# Patient Record
Sex: FEMALE | Race: WHITE | NOT HISPANIC OR LATINO | Employment: STUDENT | URBAN - METROPOLITAN AREA
[De-identification: names, ages, dates, MRNs, and addresses within clinical notes are randomized per-mention and may not be internally consistent; named-entity substitution may affect disease eponyms.]

---
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
## 2018-05-14 ENCOUNTER — TRANSFERRED RECORDS (OUTPATIENT)
Dept: HEALTH INFORMATION MANAGEMENT | Facility: CLINIC | Age: 18
End: 2018-05-14

## 2020-03-11 ENCOUNTER — APPOINTMENT (OUTPATIENT)
Dept: GENERAL RADIOLOGY | Facility: CLINIC | Age: 20
End: 2020-03-11
Attending: EMERGENCY MEDICINE
Payer: COMMERCIAL

## 2020-03-11 ENCOUNTER — HOSPITAL ENCOUNTER (EMERGENCY)
Facility: CLINIC | Age: 20
Discharge: HOME | End: 2020-03-11
Attending: EMERGENCY MEDICINE | Admitting: EMERGENCY MEDICINE
Payer: COMMERCIAL

## 2020-03-11 VITALS
SYSTOLIC BLOOD PRESSURE: 133 MMHG | RESPIRATION RATE: 18 BRPM | DIASTOLIC BLOOD PRESSURE: 92 MMHG | HEIGHT: 64 IN | OXYGEN SATURATION: 98 % | TEMPERATURE: 98.4 F | BODY MASS INDEX: 38.96 KG/M2 | WEIGHT: 228.2 LBS | HEART RATE: 71 BPM

## 2020-03-11 DIAGNOSIS — S83.004A PATELLAR DISLOCATION, RIGHT, INITIAL ENCOUNTER: ICD-10-CM

## 2020-03-11 PROCEDURE — 99284 EMERGENCY DEPT VISIT MOD MDM: CPT | Mod: 25

## 2020-03-11 PROCEDURE — 99284 EMERGENCY DEPT VISIT MOD MDM: CPT | Mod: Z6 | Performed by: EMERGENCY MEDICINE

## 2020-03-11 PROCEDURE — 73560 X-RAY EXAM OF KNEE 1 OR 2: CPT | Mod: RT

## 2020-03-11 PROCEDURE — 29505 APPLICATION LONG LEG SPLINT: CPT | Mod: RT

## 2020-03-11 PROCEDURE — 25000132 ZZH RX MED GY IP 250 OP 250 PS 637: Performed by: EMERGENCY MEDICINE

## 2020-03-11 RX ORDER — OXYCODONE HYDROCHLORIDE 5 MG/1
5 TABLET ORAL EVERY 6 HOURS PRN
Qty: 12 TABLET | Refills: 0 | Status: SHIPPED | OUTPATIENT
Start: 2020-03-11 | End: 2020-10-09

## 2020-03-11 RX ORDER — OXYCODONE HYDROCHLORIDE 5 MG/1
10 TABLET ORAL ONCE
Status: COMPLETED | OUTPATIENT
Start: 2020-03-11 | End: 2020-03-11

## 2020-03-11 RX ADMIN — OXYCODONE HYDROCHLORIDE 10 MG: 5 TABLET ORAL at 14:15

## 2020-03-11 ASSESSMENT — MIFFLIN-ST. JEOR: SCORE: 1780.11

## 2020-03-11 ASSESSMENT — ENCOUNTER SYMPTOMS
HEADACHES: 0
VOMITING: 0
WEAKNESS: 0
BACK PAIN: 0
NECK PAIN: 0
CONFUSION: 0
WOUND: 0
NAUSEA: 0
NUMBNESS: 0

## 2020-03-11 NOTE — ED AVS SNAPSHOT
[facility], [place], Emergency Department  [address] [place] [id number]  Phone:  [phone]                                    Carmenza Blackwood   MRN: [id number]    Department:  [facility], Emergency Department   Date of Visit:  3/11/2020           After Visit Summary Signature Page    I have received my discharge instructions, and my questions have been answered. I have discussed any challenges I see with this plan with the nurse or doctor.    ..........................................................................................................................................  Patient/Patient Representative Signature      ..........................................................................................................................................  Patient Representative Print Name and Relationship to Patient    ..................................................               ................................................  Date                                   Time    ..........................................................................................................................................  Reviewed by Signature/Title    ...................................................              ..............................................  Date                                               Time          22EPIC Rev 08/18

## 2020-03-11 NOTE — DISCHARGE INSTRUCTIONS
Wear the knee immobilizer anytime you are up or not lying down.  Do not bear weight on the right leg.  Return to emergency department if you have any numbness or weakness in the right leg, feet or toes.     Please make an appointment to follow up with Orthopedics (phone: [phone]) in next week for continued care.

## 2020-03-11 NOTE — ED TRIAGE NOTES
"BIBA from [facility] w/ c/o \"knee dislocation\"  While patient was being assisted to ems sling, patient thinks her knee popped back in place.  Patient arrives a/o and usually ambulatory.  Patient slipped and fell on wet carpet. Has history of right knee dislocating.  "

## 2020-03-11 NOTE — ED PROVIDER NOTES
"ED Provider Note  [facility]      History     Chief Complaint   Patient presents with     Knee Injury     The history is provided by the patient.     Carmenza Blackwood is a 20 year old female with a medical history significant for Tiana-Danlos syndrome and anxiety who presents to the Emergency Department for evaluation of right knee pain after a mechanical slip and fall.  The patient was walking in her dorm when she slipped on wet floor and fell onto her left side.  She denies any loss of consciousness and denies striking her head during the fall.  She also denies neck or back injury from the fall.  Patient does not believe that she struck her knee against anything, but, she believes that there was a dislocation, likely of the patella.  The patient reports that she had immediate 7/10 pain in her right knee and she feels that she may have dislocated her right knee during the fall.  She reports that when EMS assessed her prior to bringing her here to the Emergency Department, she believes it \"popped back in\".  The patient reports that the knee pain has somewhat improved to 5/10 in severity, but with movement it will worsen to 7/10.  The patient reports that she has dislocated her patella in the past, last time was approximately 5 years ago. The patient reports that her pain feels similar today and states that her knee \"just does not feel right\".  The patient denies any numbness or tingling in her leg or foot.  She denies any headache, neck pain, back pain or other injuries from the fall.  The patient did not take any medications for pain management prior to arrival.  The patient denies any recent drug or alcohol use.    Past Medical History  Past Medical History:   Diagnosis Date     Tiana-Danlos syndrome      Migraines      PCOS (polycystic ovarian syndrome)      POTS (postural orthostatic tachycardia syndrome)      No past surgical history on file.  oxyCODONE (ROXICODONE) 5 MG " "tablet      Allergies no known allergies  Past medical history, past surgical history, medications, and allergies were reviewed with the patient. Additional pertinent items: Anxiety    Family History  No family history on file.  Family history was reviewed with the patient. Additional pertinent items: None    Social History  Social History     Tobacco Use     Smoking status: Never Smoker     Smokeless tobacco: Never Used   Substance Use Topics     Alcohol use: Not Currently     Drug use: Never      Social history was reviewed with the patient. Additional pertinent items: None    Review of Systems   Cardiovascular: Negative for chest pain.   Gastrointestinal: Negative for nausea and vomiting.   Musculoskeletal: Negative for back pain and neck pain.        Positive for right knee pain   Skin: Negative for wound.   Neurological: Negative for syncope, weakness, numbness and headaches.   Psychiatric/Behavioral: Negative for confusion.         Physical Exam   BP: (!) 148/84  Pulse: 81  Temp: 98.1  F (36.7  C)  Resp: 16  Height: 162.6 cm (5' 4\")  Weight: 103.5 kg (228 lb 3.2 oz)  SpO2: 95 %  Physical Exam    Gen:  Alert, no acute distress, well appearing  HEENT:  Atraumatic, no facial tenderness, ecchymosis  Neck:  No c-spine tenderness  Chest:  No chest wall tenderness, ecchymosis, lungs clear, heart sounds regular  Abdomen:  Soft, non-tender  Back: No t-spine or L-spine tenderness, no step-offs, ecchymosis  Extremities:  Moves all extremities with normal range of motion, at the time of my exam, the patient's right knee has normal appearance with no ecchymosis, no abrasion.  There is diffuse anterior knee tenderness, but no dislocation of the patella or the knee itself on exam.  Patient is reluctant to test range of motion of the knee due to pain.  She will slightly internally and externally rotate the right leg and has no pain in the hip with rotation.  Vascular: Strong and equal dorsalis pedis pulses " bilaterally.  Neuro:  Oriented X3, normal strength and sensation in the feet and toes bilaterally.  Skin:  No abrasions, lacerations      ED Course      Procedures             Patient was given oxycodone for pain and had significant improvement.  X-ray of the right knee was reviewed by me and results are shown here:  Results for orders placed or performed during the hospital encounter of 03/11/20   XR Knee Right 1/2 Views    Narrative    2 views right knee radiographs 3/11/2020 3:17 PM    History: trauma     Comparison: None    Findings:    AP and lateral views of the right knee were obtained.     Subtle cortical cortical irregularity involving the proximal patella  on the lateral view. Trace Joint effusion.    No substantial degenerative change.    Soft tissue is unremarkable.      Impression    Impression:  Subtle cortical irregularity involving the proximal pole of the  patella on the lateral view. Correlate for point tenderness in this  region.    NICOLA MEDRANO MD (Joe)     Patient was discussed with orthopedics who reviewed the x-rays.  Orthopedics was concerned there may be a slight defect on the tibia, so they recommended knee immobilizer and nonweightbearing status.  Blood pressures were taken in both calves in both arms for comparison.  Right arm blood pressure is 128/76   right leg blood pressure is 155/76  Left arm blood pressure 131/84  Left leg blood pressure 157/87.     Results for orders placed or performed during the hospital encounter of 03/11/20   XR Knee Right 1/2 Views     Status: None    Narrative    2 views right knee radiographs 3/11/2020 3:17 PM    History: trauma     Comparison: None    Findings:    AP and lateral views of the right knee were obtained.     Subtle cortical cortical irregularity involving the proximal patella  on the lateral view. Trace Joint effusion.    No substantial degenerative change.    Soft tissue is unremarkable.      Impression    Impression:  Subtle cortical  irregularity involving the proximal pole of the  patella on the lateral view. Correlate for point tenderness in this  region.    NICOLA (Barbara MEDRANO MD     Medications   oxyCODONE (ROXICODONE) tablet 10 mg (10 mg Oral Given 3/11/20 1415)        Assessments & Plan (with Medical Decision Making)   Patient presents with right knee pain after slip and fall.  Did not specifically hit the right knee, however, she noted a deformity that then spontaneously reduced when paramedics repositioned her.  Patient's knee has mild swelling but no ecchymosis.  She is able to raise the right leg and keep the lower leg elevated, but is reluctant to allow range of motion testing due to pain.  X-ray shows possible patellar injury.  Patient will wear knee immobilizer and be nonweightbearing and follow-up with orthopedics next week.  No sign of neurovascular injury at this time.  Patient was advised to return the emergency department if she has any numbness, weakness, change in color of her feet or toes or other concerns.    I have reviewed the nursing notes. I have reviewed the findings, diagnosis, plan and need for follow up with the patient.    New Prescriptions    OXYCODONE (ROXICODONE) 5 MG TABLET    Take 1 tablet (5 mg) by mouth every 6 hours as needed for pain       Final diagnoses:   Patellar dislocation, right, initial encounter       --  IToñito, am serving as a trained medical scribe to document services personally performed by Angelica Kinsey MD, based on the provider's statements to me.     Angelica GALINDO MD, was physically present and have reviewed and verified the accuracy of this note documented by Toñito Álvarez.    [facility], EMERGENCY DEPARTMENT  3/11/2020     Angelica Kinsey MD  03/11/20 [id number]

## 2020-03-13 NOTE — TELEPHONE ENCOUNTER
DIAGNOSIS: Rt Patella Dislocation Hospital F/U, Pt States ED Told Her To F/U in 1 Week Due To What Was Seen on Xray, Imaging in Epic, No Hx of Surgery, Per Pt   APPOINTMENT DATE: 3/18   NOTES STATUS DETAILS   OFFICE NOTE from referring provider N/A    OFFICE NOTE from other specialist N/A    DISCHARGE SUMMARY from hospital N/A    DISCHARGE REPORT from the ER Internal 3/11/20   OPERATIVE REPORT N/A    MEDICATION LIST Internal    MRI N/A    CT SCAN N/A    XRAYS (IMAGES & REPORTS) Internal 3/11/20

## 2020-03-16 ENCOUNTER — TELEPHONE (OUTPATIENT)
Dept: ORTHOPEDICS | Facility: CLINIC | Age: 20
End: 2020-03-16

## 2020-03-16 NOTE — TELEPHONE ENCOUNTER
Left voicemail for patient, informing her that due to the current state of COVID-19, we are trying to limit patient appointments in our clinic. Callback number was left for further discussion or reschedule.

## 2020-03-18 ENCOUNTER — PRE VISIT (OUTPATIENT)
Dept: ORTHOPEDICS | Facility: CLINIC | Age: 20
End: 2020-03-18

## 2020-10-01 ASSESSMENT — ANXIETY QUESTIONNAIRES
5. BEING SO RESTLESS THAT IT IS HARD TO SIT STILL: MORE THAN HALF THE DAYS
2. NOT BEING ABLE TO STOP OR CONTROL WORRYING: MORE THAN HALF THE DAYS
1. FEELING NERVOUS, ANXIOUS, OR ON EDGE: MORE THAN HALF THE DAYS
3. WORRYING TOO MUCH ABOUT DIFFERENT THINGS: MORE THAN HALF THE DAYS
7. FEELING AFRAID AS IF SOMETHING AWFUL MIGHT HAPPEN: MORE THAN HALF THE DAYS
4. TROUBLE RELAXING: MORE THAN HALF THE DAYS
6. BECOMING EASILY ANNOYED OR IRRITABLE: MORE THAN HALF THE DAYS
GAD7 TOTAL SCORE: 14

## 2020-10-05 ASSESSMENT — ANXIETY QUESTIONNAIRES
2. NOT BEING ABLE TO STOP OR CONTROL WORRYING: MORE THAN HALF THE DAYS
6. BECOMING EASILY ANNOYED OR IRRITABLE: MORE THAN HALF THE DAYS
GAD7 TOTAL SCORE: 14
7. FEELING AFRAID AS IF SOMETHING AWFUL MIGHT HAPPEN: MORE THAN HALF THE DAYS
5. BEING SO RESTLESS THAT IT IS HARD TO SIT STILL: MORE THAN HALF THE DAYS
3. WORRYING TOO MUCH ABOUT DIFFERENT THINGS: MORE THAN HALF THE DAYS
7. FEELING AFRAID AS IF SOMETHING AWFUL MIGHT HAPPEN: MORE THAN HALF THE DAYS
1. FEELING NERVOUS, ANXIOUS, OR ON EDGE: MORE THAN HALF THE DAYS
4. TROUBLE RELAXING: MORE THAN HALF THE DAYS
GAD7 TOTAL SCORE: 14

## 2020-10-05 ASSESSMENT — ENCOUNTER SYMPTOMS
BACK PAIN: 1
MYALGIAS: 1
ALTERED TEMPERATURE REGULATION: 1
POLYDIPSIA: 1
JOINT SWELLING: 0
CHILLS: 0
INSOMNIA: 1
DECREASED CONCENTRATION: 1
WEIGHT GAIN: 1
DECREASED APPETITE: 0
NECK PAIN: 0
HOT FLASHES: 1
INCREASED ENERGY: 1
MUSCLE CRAMPS: 0
DEPRESSION: 1
STIFFNESS: 0
NERVOUS/ANXIOUS: 1
ARTHRALGIAS: 1
FEVER: 0
WEIGHT LOSS: 0
MUSCLE WEAKNESS: 0
NIGHT SWEATS: 0
DECREASED LIBIDO: 1
PANIC: 1
HALLUCINATIONS: 0
FATIGUE: 1
POLYPHAGIA: 0

## 2020-10-09 ENCOUNTER — OFFICE VISIT (OUTPATIENT)
Dept: OBGYN | Facility: CLINIC | Age: 20
End: 2020-10-09
Attending: OBSTETRICS & GYNECOLOGY
Payer: COMMERCIAL

## 2020-10-09 VITALS
SYSTOLIC BLOOD PRESSURE: 133 MMHG | HEIGHT: 66 IN | DIASTOLIC BLOOD PRESSURE: 86 MMHG | HEART RATE: 112 BPM | BODY MASS INDEX: 36.96 KG/M2 | WEIGHT: 230 LBS

## 2020-10-09 DIAGNOSIS — Z29.9 PREVENTIVE MEASURE: Primary | ICD-10-CM

## 2020-10-09 DIAGNOSIS — E28.2 PCOS (POLYCYSTIC OVARIAN SYNDROME): ICD-10-CM

## 2020-10-09 LAB
CHOLEST SERPL-MCNC: 160 MG/DL
HBA1C MFR BLD: 5.1 % (ref 0–5.6)
HDLC SERPL-MCNC: 32 MG/DL
HGB BLD-MCNC: 14.4 G/DL (ref 11.7–15.7)
LDLC SERPL CALC-MCNC: 60 MG/DL
NONHDLC SERPL-MCNC: 128 MG/DL
TRIGL SERPL-MCNC: 339 MG/DL
TSH SERPL DL<=0.005 MIU/L-ACNC: 0.62 MU/L (ref 0.4–4)

## 2020-10-09 PROCEDURE — 80061 LIPID PANEL: CPT | Performed by: OBSTETRICS & GYNECOLOGY

## 2020-10-09 PROCEDURE — 36415 COLL VENOUS BLD VENIPUNCTURE: CPT | Performed by: OBSTETRICS & GYNECOLOGY

## 2020-10-09 PROCEDURE — 99203 OFFICE O/P NEW LOW 30 MIN: CPT | Mod: GE | Performed by: OBSTETRICS & GYNECOLOGY

## 2020-10-09 PROCEDURE — G0463 HOSPITAL OUTPT CLINIC VISIT: HCPCS

## 2020-10-09 PROCEDURE — 83036 HEMOGLOBIN GLYCOSYLATED A1C: CPT | Performed by: OBSTETRICS & GYNECOLOGY

## 2020-10-09 PROCEDURE — 85018 HEMOGLOBIN: CPT | Performed by: OBSTETRICS & GYNECOLOGY

## 2020-10-09 PROCEDURE — 84443 ASSAY THYROID STIM HORMONE: CPT | Performed by: OBSTETRICS & GYNECOLOGY

## 2020-10-09 RX ORDER — SUMATRIPTAN 100 MG/1
TABLET, FILM COATED ORAL
COMMUNITY
End: 2023-05-01

## 2020-10-09 RX ORDER — SPIRONOLACTONE 50 MG/1
50 TABLET, FILM COATED ORAL DAILY
Qty: 60 TABLET | Refills: 0 | Status: SHIPPED | OUTPATIENT
Start: 2020-10-09 | End: 2020-12-13

## 2020-10-09 ASSESSMENT — ANXIETY QUESTIONNAIRES
GAD7 TOTAL SCORE: 13
5. BEING SO RESTLESS THAT IT IS HARD TO SIT STILL: MORE THAN HALF THE DAYS
7. FEELING AFRAID AS IF SOMETHING AWFUL MIGHT HAPPEN: MORE THAN HALF THE DAYS
2. NOT BEING ABLE TO STOP OR CONTROL WORRYING: MORE THAN HALF THE DAYS
1. FEELING NERVOUS, ANXIOUS, OR ON EDGE: MORE THAN HALF THE DAYS
3. WORRYING TOO MUCH ABOUT DIFFERENT THINGS: MORE THAN HALF THE DAYS
IF YOU CHECKED OFF ANY PROBLEMS ON THIS QUESTIONNAIRE, HOW DIFFICULT HAVE THESE PROBLEMS MADE IT FOR YOU TO DO YOUR WORK, TAKE CARE OF THINGS AT HOME, OR GET ALONG WITH OTHER PEOPLE: SOMEWHAT DIFFICULT
6. BECOMING EASILY ANNOYED OR IRRITABLE: SEVERAL DAYS

## 2020-10-09 ASSESSMENT — MIFFLIN-ST. JEOR: SCORE: 1820.02

## 2020-10-09 ASSESSMENT — PATIENT HEALTH QUESTIONNAIRE - PHQ9
SUM OF ALL RESPONSES TO PHQ QUESTIONS 1-9: 14
5. POOR APPETITE OR OVEREATING: MORE THAN HALF THE DAYS

## 2020-10-09 ASSESSMENT — PAIN SCALES - GENERAL: PAINLEVEL: NO PAIN (0)

## 2020-10-09 NOTE — LETTER
Date:October 12, 2020      Provider requested that no letter be sent. Do not send.       [facility]

## 2020-10-09 NOTE — NURSING NOTE
Chief Complaint   Patient presents with     Establish Care     PCOS magement , birth control consult   Katie Parra , NUBIA

## 2020-10-09 NOTE — LETTER
10/9/2020       RE: Carmenza Blackwood  [address] [address]     Dear Colleague,    Thank you for referring your patient, Carmenza Blackwood, to the [facility] [place] at [facility]. Please see a copy of my visit note below.    [facility]  DOS: 10/9/2020  CC: weight gain and fluctuations, as well as painful periods    HPI:    Carmenza Blackwood is a 20 year old G0 here for PCOS management    Has never had a regular period, but increase in pain with them recently. Diagnosed with PCOS by irregular periods, US, and hair growth on chest/problems with acne. Has gained 15 lbs over past month.     She is currently not interested in pregnancy. Has tried OCPs and NuvaRing. Reports NuvaRing allergy, rash and swelling in pelvic area. Did not like how OCPs made her feel, caused weight gain.     Eats pretty healthy, does not usually eat breakfast. Walks ~3miles a day. Does PT several times a week.    Has migraines 2-3x per month, has spots in vision both before and during migraine.     GYN History  - LMP: Patient's last menstrual period was 09/06/2020.  - Menses: generally super heavy, can go through super tampon in 2 hours, last 7-8 days, or 1 day then stop. Regularly goes 3 months without a period  - Pap Smears: Had one at 17, thinks it was normal, has not had one since  - Contraception: no  - Sexual Activity: no  - Hx STIs: denies    OBHx  OB History   No obstetric history on file.     PMHx:   Past Medical History:   Diagnosis Date     Tiana-Danlos syndrome      Migraines      PCOS (polycystic ovarian syndrome)      POTS (postural orthostatic tachycardia syndrome)        PSHx: Appendectomy, tonsillectomy and adenoidectomy    Meds: Sertraline    Allergies:  NuvaRing    SocHx: Student at the [employer], studying special education     FamHx: Great grandmother (maternal) breast cancer  Does not know much about paternal family history    ROS: 10-Point ROS negative except  "as noted in HPI      Physical Exam  /86   Pulse 112   Ht 1.676 m (5' 6\")   Wt 104.3 kg (230 lb)   LMP 09/06/2020   Breastfeeding No   BMI 37.12 kg/m      Gen: Well-appearing, NAD  HEENT: Normocephalic, atraumatic  CV:  Regular rate  Pulm: Nonlabored on RA, no increased work of breathing  Abd: Soft, non-tender, non-distended  Ext: No LE edema, extremities warm and well perfused   Psych:   normal affect    Assessment/Plan  Carmenza Blackwood is a 20 year old G0 female here for PCOS mgmt. PMH significant for Tiana-Danlos.    PCOS:  - meets all 3 [place] criteria   - reviewed prior US records, ovaries w/ multiple follicles  - endometrial protection   - discussed OCPs vs IUD and patient would like to try Mirena IUD. Has not liked OCPs in the past  - will start 50mg daily spironolactone for anti-androgen effect  - will check lipid panel, A1C, Hgb and TSH today  - discussed increasing daily miles, continuing exercise, and trying different diet    - gave instructions on ways to try intermittent fasting if interested    Would like lab results via Docin    Follow Up: prn for Mirena IUD insertion    Patient discussed with Dr. Bermeo who is in agreement with plan.    Angy Trinidad MD  Obstetrics and Gynecology PGY-2  227PM 10/9/2020    The Patient was seen in Resident Continuity Clinic by ANGY TRINIDAD.  I reviewed the history & exam. Assessment and plan were jointly made.    April Bermeo MD              Again, thank you for allowing me to participate in the care of your patient.      Sincerely,    Angy Trinidad MD      "

## 2020-10-09 NOTE — PATIENT INSTRUCTIONS
Take 800mg ibuprofen, and 1000mg tylenol 1 hour prior to IUD insertion.     You can try increasing daily walking length, and intermittent fasting.     Spironolactone daily for anti-androgen effects.

## 2020-10-09 NOTE — PROGRESS NOTES
"[facility]  DOS: 10/9/2020  CC: weight gain and fluctuations, as well as painful periods    HPI:    Carmenza Blackwood is a 20 year old G0 here for PCOS management    Has never had a regular period, but increase in pain with them recently. Diagnosed with PCOS by irregular periods, US, and hair growth on chest/problems with acne. Has gained 15 lbs over past month.     She is currently not interested in pregnancy. Has tried OCPs and NuvaRing. Reports NuvaRing allergy, rash and swelling in pelvic area. Did not like how OCPs made her feel, caused weight gain.     Eats pretty healthy, does not usually eat breakfast. Walks ~3miles a day. Does PT several times a week.    Has migraines 2-3x per month, has spots in vision both before and during migraine.     GYN History  - LMP: Patient's last menstrual period was 09/06/2020.  - Menses: generally super heavy, can go through super tampon in 2 hours, last 7-8 days, or 1 day then stop. Regularly goes 3 months without a period  - Pap Smears: Had one at 17, thinks it was normal, has not had one since  - Contraception: no  - Sexual Activity: no  - Hx STIs: denies    OBHx  OB History   No obstetric history on file.     PMHx:   Past Medical History:   Diagnosis Date     Tiana-Danlos syndrome      Migraines      PCOS (polycystic ovarian syndrome)      POTS (postural orthostatic tachycardia syndrome)        PSHx: Appendectomy, tonsillectomy and adenoidectomy    Meds: Sertraline    Allergies:  NuvaRing    SocHx: Student at the [employer], studying special education     FamHx: Great grandmother (maternal) breast cancer  Does not know much about paternal family history    ROS: 10-Point ROS negative except as noted in HPI      Physical Exam  /86   Pulse 112   Ht 1.676 m (5' 6\")   Wt 104.3 kg (230 lb)   LMP 09/06/2020   Breastfeeding No   BMI 37.12 kg/m      Gen: Well-appearing, NAD  HEENT: Normocephalic, atraumatic  CV:  Regular rate  Pulm: Nonlabored on RA, no increased work of " breathing  Abd: Soft, non-tender, non-distended  Ext: No LE edema, extremities warm and well perfused   Psych:   normal affect    Assessment/Plan  Carmenza Blackwood is a 20 year old G0 female here for PCOS mgmt. PMH significant for Tiana-Danlos.    PCOS:  - meets all 3 [place] criteria   - reviewed prior US records, ovaries w/ multiple follicles  - endometrial protection   - discussed OCPs vs IUD and patient would like to try Mirena IUD. Has not liked OCPs in the past  - will start 50mg daily spironolactone for anti-androgen effect  - will check lipid panel, A1C, Hgb and TSH today  - discussed increasing daily miles, continuing exercise, and trying different diet    - gave instructions on ways to try intermittent fasting if interested    Would like lab results via Referral[facility]IM    Follow Up: prn for Mirena IUD insertion    Patient discussed with Dr. Bermeo who is in agreement with plan.    Angy Trinidad MD  Obstetrics and Gynecology PGY-2  227PM 10/9/2020    The Patient was seen in Resident Continuity Clinic by ANGY TRINIDAD.  I reviewed the history & exam. Assessment and plan were jointly made.    April Bermeo MD

## 2020-10-27 ENCOUNTER — OFFICE VISIT (OUTPATIENT)
Dept: OBGYN | Facility: CLINIC | Age: 20
End: 2020-10-27
Payer: COMMERCIAL

## 2020-10-27 VITALS
BODY MASS INDEX: 36.64 KG/M2 | WEIGHT: 228 LBS | HEIGHT: 66 IN | SYSTOLIC BLOOD PRESSURE: 137 MMHG | DIASTOLIC BLOOD PRESSURE: 85 MMHG | HEART RATE: 108 BPM

## 2020-10-27 DIAGNOSIS — Z30.430 ENCOUNTER FOR IUD INSERTION: Primary | ICD-10-CM

## 2020-10-27 DIAGNOSIS — E28.2 PCOS (POLYCYSTIC OVARIAN SYNDROME): Chronic | ICD-10-CM

## 2020-10-27 DIAGNOSIS — Z30.430 ENCOUNTER FOR INSERTION OF INTRAUTERINE CONTRACEPTIVE DEVICE: ICD-10-CM

## 2020-10-27 PROCEDURE — G0463 HOSPITAL OUTPT CLINIC VISIT: HCPCS | Mod: 25 | Performed by: STUDENT IN AN ORGANIZED HEALTH CARE EDUCATION/TRAINING PROGRAM

## 2020-10-27 PROCEDURE — 58300 INSERT INTRAUTERINE DEVICE: CPT | Performed by: STUDENT IN AN ORGANIZED HEALTH CARE EDUCATION/TRAINING PROGRAM

## 2020-10-27 PROCEDURE — 250N000011 HC RX IP 250 OP 636: Performed by: STUDENT IN AN ORGANIZED HEALTH CARE EDUCATION/TRAINING PROGRAM

## 2020-10-27 PROCEDURE — 58300 INSERT INTRAUTERINE DEVICE: CPT | Mod: GC | Performed by: OBSTETRICS & GYNECOLOGY

## 2020-10-27 RX ORDER — PROPRANOLOL HYDROCHLORIDE 20 MG/1
20 TABLET ORAL 2 TIMES DAILY
COMMUNITY
Start: 2020-10-14 | End: 2021-12-13

## 2020-10-27 RX ADMIN — LEVONORGESTREL 20 MCG: 52 INTRAUTERINE DEVICE INTRAUTERINE at 15:24

## 2020-10-27 ASSESSMENT — PAIN SCALES - GENERAL: PAINLEVEL: NO PAIN (0)

## 2020-10-27 ASSESSMENT — MIFFLIN-ST. JEOR: SCORE: 1810.95

## 2020-10-27 NOTE — PROCEDURES
[facility]  IUD Insertion Procedure Note   Date of Service: 10/27/2020     Patient: Carmenza Blackwood   MRN: [id number]  : 2000      Consent: Prior to the procedure the expected benefits, risks, and alteratives to Mirena IUD placement were discussed with the patient. All questions were answered. Patient expressed desire to proceed with Mirena IUD placement. Written informed consent was signed and scanned into medical record.      Indication: Carmenza Blackwood is a 20 year old G0 with PCOS, oligomenorrhea, and need for endometrial protection.      Findings: Normal appearing external genitalia without lesions. Anteverted, mobile uterus. Pink, well rugated vaginal mucosa. Smooth, mobile cervix without masses or lesions. Hemostasis noted at end of procedure.     EBL: 2 mL     Implant: Mirena IUD, LOT: [id number] EXP: DEC 2022   Complications: None     Procedure Details:   The patient was positioned in dorsal lithotomy with feet in stirrups. A Medium graves speculum was placed in the vagina to visualize the cervix. The cervix was prepped with betadine x3. The anterior lip of the cervix was grasped with a single tooth tenaculum. A uterine sound was unable to be passed into the uterine cavity. The cervix was dilated and the sound was passed to measure the uterine cavity. The Mirena IUD insertion apparatus was prepared, inserted through the cervix without difficulty, and deployed at the fundus in the usual fashion per the 's guidelines. The IUD strings were trimmed. The tenaculum was removed from the cervix and hemostasis was noted. The speculum was removed from the vagina.      The patient tolerated the procedure with significant discomfort.      Dr. Marshall was present for the entire procedure.      Last Crain MD  Ob/Gyn Resident, PGY-2  10/27/20 2:37 PM     The Patient was seen in Resident Continuity Clinic by LAST CRAIN  I reviewed the history & exam. Assessment and plan were jointly made. I was  present for IUD insertion.     Kamilla Marshall MD

## 2020-10-27 NOTE — Clinical Note
Continuity clinic procedure note from 10/27/2020 requires cosign. Thank you for helping with this procedure and ensuring patient was able to have her IUD placed.

## 2020-10-27 NOTE — PROGRESS NOTES
[facility]  IUD Insertion Procedure Note   Date of Service: 10/27/2020    Patient: Carmenza Blackwood   MRN: [id number]  : 2000     Consent: Prior to the procedure the expected benefits, risks, and alteratives to *** IUD placement were discussed with the patient. All questions were answered. Patient expressed desire to proceed with *** IUD placement. Written informed consent was signed and scanned into medical record.     Indication: Carmenza Blackwood is a 20 year old G***P*** with PCOS *** desire for long acting reversible contraception***abnormal uterine bleeding***need emergency contraception***endometrial hyperplasia***.     Findings: *** Normal appearing external genitalia without lesions. Pink, well rugated vaginal mucosa. Smooth, mobile cervix without masses or lesions. Hemostasis noted at end of procedure. ***    EBL: 2 mL    Implant: Mirena IUD, LOT: [id number] EXP: DEC 2022   Complications: None    Procedure Details:   The patient was positioned in dorsal lithotomy with feet in stirrups. A *** speculum was placed in the vagina to visualize the cervix. The cervix was prepped with ***betadine x3. *** The anterior lip of the cervix was *** injected with 1 ml of  ***1% lidocaine *** and grasped with a single tooth tenaculum. ***A paracervical block was preformed at 4 o'clock and 8 o'clock for a total of *** ml of ***1% lidocaine *** used. The uterus was then sounded to *** cm. The *** IUD insertion apparatus was prepared, inserted through the cervix without difficulty, and deployed at the fundus in the usual fashion per the 's guidelines. The IUD strings were trimmed to approximately 3 cm. The tenaculum was removed from the cervix and hemostasis was noted after ***. The speculum was removed from the vagina.     The patient tolerated the procedure well.     *** was present for the entire procedure     Last Ocampo MD  Ob/Gyn Resident, PGY-2  10/27/20 2:37 PM

## 2020-10-28 PROBLEM — E28.2 PCOS (POLYCYSTIC OVARIAN SYNDROME): Chronic | Status: ACTIVE | Noted: 2020-10-28

## 2020-10-30 ENCOUNTER — HOSPITAL ENCOUNTER (EMERGENCY)
Facility: CLINIC | Age: 20
Discharge: HOME | End: 2020-10-31
Attending: EMERGENCY MEDICINE | Admitting: EMERGENCY MEDICINE
Payer: COMMERCIAL

## 2020-10-30 DIAGNOSIS — R10.2 PELVIC PAIN IN FEMALE: ICD-10-CM

## 2020-10-30 DIAGNOSIS — Z97.5 IUD (INTRAUTERINE DEVICE) IN PLACE: ICD-10-CM

## 2020-10-30 PROCEDURE — 99284 EMERGENCY DEPT VISIT MOD MDM: CPT | Mod: 25 | Performed by: EMERGENCY MEDICINE

## 2020-10-30 PROCEDURE — 99283 EMERGENCY DEPT VISIT LOW MDM: CPT | Performed by: EMERGENCY MEDICINE

## 2020-10-30 ASSESSMENT — MIFFLIN-ST. JEOR: SCORE: 1774.66

## 2020-10-30 NOTE — ED AVS SNAPSHOT
[facility] Emergency Department  [address] [address]  Phone: [phone]                                    Carmenza Blackwood   MRN: [id number]    Department: [facility] Emergency Department   Date of Visit: 10/30/2020           After Visit Summary Signature Page    I have received my discharge instructions, and my questions have been answered. I have discussed any challenges I see with this plan with the nurse or doctor.    ..........................................................................................................................................  Patient/Patient Representative Signature      ..........................................................................................................................................  Patient Representative Print Name and Relationship to Patient    ..................................................               ................................................  Date                                   Time    ..........................................................................................................................................  Reviewed by Signature/Title    ...................................................              ..............................................  Date                                               Time          22EPIC Rev 08/18

## 2020-10-31 ENCOUNTER — APPOINTMENT (OUTPATIENT)
Dept: ULTRASOUND IMAGING | Facility: CLINIC | Age: 20
End: 2020-10-31
Attending: EMERGENCY MEDICINE
Payer: COMMERCIAL

## 2020-10-31 VITALS
WEIGHT: 220 LBS | SYSTOLIC BLOOD PRESSURE: 141 MMHG | OXYGEN SATURATION: 100 % | HEART RATE: 78 BPM | BODY MASS INDEX: 35.36 KG/M2 | RESPIRATION RATE: 16 BRPM | DIASTOLIC BLOOD PRESSURE: 97 MMHG | HEIGHT: 66 IN | TEMPERATURE: 98.1 F

## 2020-10-31 LAB
SPECIMEN SOURCE: NORMAL
WET PREP SPEC: NORMAL

## 2020-10-31 PROCEDURE — 87210 SMEAR WET MOUNT SALINE/INK: CPT | Performed by: EMERGENCY MEDICINE

## 2020-10-31 PROCEDURE — 76856 US EXAM PELVIC COMPLETE: CPT

## 2020-10-31 PROCEDURE — 76856 US EXAM PELVIC COMPLETE: CPT | Mod: 26 | Performed by: RADIOLOGY

## 2020-10-31 PROCEDURE — 76830 TRANSVAGINAL US NON-OB: CPT | Mod: 26 | Performed by: RADIOLOGY

## 2020-10-31 NOTE — DISCHARGE INSTRUCTIONS
Please make an appointment to follow up with OB/Gyn - [facility] (phone: [phone]) in 3 days if not improving.

## 2020-10-31 NOTE — ED TRIAGE NOTES
"Pt arrives to triage with concerns about her IUD. Pt had IUD placed on Tuesday. Since Tuesday, pt reports increased abdominal and vaginal pain. Pt reports vaginal bleeding, \"more than a period\". Pt currently is not wearing a pad. A&O, VSS  "

## 2020-12-03 ENCOUNTER — MYC MEDICAL ADVICE (OUTPATIENT)
Dept: OBGYN | Facility: CLINIC | Age: 20
End: 2020-12-03

## 2020-12-03 DIAGNOSIS — R10.2 PELVIC PAIN IN FEMALE: Primary | ICD-10-CM

## 2020-12-03 NOTE — TELEPHONE ENCOUNTER
"Called Carmenza and advised her that ultrasound order is placed, and transferred to     Carmenza Blackwood Nickey, MD 1 hour ago (2:33 PM)        I think I feel the actual IUD in my vagina, so I am going to schedule an ultrasound. As I ended up in the ER the same week as placement for dislodgment, I think this is the best option.            April Bermeo MD Dwyer, Kaitlyn 2 hours ago (1:29 PM)        Jose Herr,      It's not \"normal\" but also it does happen sometimes.      Any chance the IUD is not there? Can you feel the strings?  Next steps:  1. Wait to see if it persists or is a one time thing.   2. If you cannot feel the strings or don't want to wait, please schedule an ultrasound  3. If the ultrasound shows the IUD is in the correct spot then we can talk about other options.     Dr. Bermeo for Dr. Trinidad (who will be in clinic 12/4)               Elo Garza LPN routed conversation to April Bermeo MD 6 hours ago (9:40 AM)      Carmenza Blackwood Nickey, MD 13 hours ago (2:01 AM)        Hi Doc,  I got my IUD put in awhile ago and I'm on my first period since the IUD. The problem is I'm currently on day 18 of my period and its all fresh blood, it had started off as brown dark blood after the first day or two, but changed back a day or two ago. Is it normal? Cause it doesn't seem normal.       "

## 2020-12-04 ENCOUNTER — ANCILLARY PROCEDURE (OUTPATIENT)
Dept: ULTRASOUND IMAGING | Facility: CLINIC | Age: 20
End: 2020-12-04
Attending: STUDENT IN AN ORGANIZED HEALTH CARE EDUCATION/TRAINING PROGRAM
Payer: COMMERCIAL

## 2020-12-04 DIAGNOSIS — R10.2 PELVIC PAIN IN FEMALE: ICD-10-CM

## 2020-12-04 PROCEDURE — 76830 TRANSVAGINAL US NON-OB: CPT | Mod: 26 | Performed by: OBSTETRICS & GYNECOLOGY

## 2020-12-04 PROCEDURE — 76830 TRANSVAGINAL US NON-OB: CPT

## 2020-12-13 ENCOUNTER — MYC REFILL (OUTPATIENT)
Dept: OBGYN | Facility: CLINIC | Age: 20
End: 2020-12-13

## 2020-12-13 DIAGNOSIS — E28.2 PCOS (POLYCYSTIC OVARIAN SYNDROME): ICD-10-CM

## 2020-12-15 RX ORDER — SPIRONOLACTONE 50 MG/1
50 TABLET, FILM COATED ORAL DAILY
Qty: 60 TABLET | Refills: 0 | Status: SHIPPED | OUTPATIENT
Start: 2020-12-15 | End: 2021-02-10

## 2021-01-04 ENCOUNTER — HEALTH MAINTENANCE LETTER (OUTPATIENT)
Age: 21
End: 2021-01-04

## 2021-02-10 ENCOUNTER — MYC REFILL (OUTPATIENT)
Dept: OBGYN | Facility: CLINIC | Age: 21
End: 2021-02-10

## 2021-02-10 DIAGNOSIS — E28.2 PCOS (POLYCYSTIC OVARIAN SYNDROME): ICD-10-CM

## 2021-02-11 RX ORDER — SPIRONOLACTONE 50 MG/1
50 TABLET, FILM COATED ORAL DAILY
Qty: 60 TABLET | Refills: 0 | Status: SHIPPED | OUTPATIENT
Start: 2021-02-11 | End: 2021-03-29

## 2021-03-29 ENCOUNTER — MYC REFILL (OUTPATIENT)
Dept: OBGYN | Facility: CLINIC | Age: 21
End: 2021-03-29

## 2021-03-29 DIAGNOSIS — E28.2 PCOS (POLYCYSTIC OVARIAN SYNDROME): ICD-10-CM

## 2021-04-01 RX ORDER — SPIRONOLACTONE 50 MG/1
50 TABLET, FILM COATED ORAL DAILY
Qty: 90 TABLET | Refills: 1 | Status: SHIPPED | OUTPATIENT
Start: 2021-04-01 | End: 2021-12-13

## 2021-04-01 NOTE — TELEPHONE ENCOUNTER
Received refill request for spironolactone for PCOS. Patient was seen in continuity clinic 10/2020 where this was discussed and IUD was placed. Rx sent per protocol.

## 2021-10-10 ENCOUNTER — HEALTH MAINTENANCE LETTER (OUTPATIENT)
Age: 21
End: 2021-10-10

## 2021-11-30 ENCOUNTER — LAB REQUISITION (OUTPATIENT)
Dept: LAB | Facility: CLINIC | Age: 21
End: 2021-11-30

## 2021-11-30 LAB — HBV SURFACE AB SERPL IA-ACNC: 1.68 M[IU]/ML

## 2021-11-30 PROCEDURE — 86481 TB AG RESPONSE T-CELL SUSP: CPT | Performed by: INTERNAL MEDICINE

## 2021-11-30 PROCEDURE — 86706 HEP B SURFACE ANTIBODY: CPT | Performed by: INTERNAL MEDICINE

## 2021-12-02 LAB
GAMMA INTERFERON BACKGROUND BLD IA-ACNC: 0 IU/ML
M TB IFN-G BLD-IMP: NEGATIVE
M TB IFN-G CD4+ BCKGRND COR BLD-ACNC: 9.57 IU/ML
MITOGEN IGNF BCKGRD COR BLD-ACNC: 0 IU/ML
MITOGEN IGNF BCKGRD COR BLD-ACNC: 0 IU/ML
QUANTIFERON MITOGEN: 9.57 IU/ML
QUANTIFERON NIL TUBE: 0 IU/ML
QUANTIFERON TB1 TUBE: 0 IU/ML
QUANTIFERON TB2 TUBE: 0

## 2021-12-12 NOTE — PROGRESS NOTES
"  Assessment & Plan     Dislocation of right patella, subsequent encounter  Workability form was completed to reflect that she is able to work without restrictions.  Follow-up as needed for this concern.    High priority for 2019-nCoV vaccine  Moderna booster was provided today.  - COVID-19,PF,MODERNA (18+ Yrs BOOSTER .25mL)             BMI:   Estimated body mass index is 39.48 kg/m  as calculated from the following:    Height as of this encounter: 1.626 m (5' 4\").    Weight as of this encounter: 104.3 kg (230 lb).           No follow-ups on file.    Augusto Christie MD  [facility]    Veena Burgos is a 21 year old who presents for the following health issues     History of Present Illness       She eats 0-1 servings of fruits and vegetables daily.She consumes 1 sweetened beverage(s) daily.She exercises with enough effort to increase her heart rate 10 to 19 minutes per day.  She exercises with enough effort to increase her heart rate 3 or less days per week.   She is taking medications regularly.     Form to fill out.       Patient needs a workability form completed for her employer, [employer].  She had a right patellar dislocation that was nonwork-related about 1-1/2 years ago in March 2020.  She was in a knee immobilizer for about a month and then physical therapy but reports that she has made a full recovery since that time and did not have any significant temporary or permanent restrictions placed upon her.  She has been working in previous position without restriction.    She is also interested in receiving a booster shot for the majority vaccine today.    Review of Systems   Constitutional, HEENT, cardiovascular, pulmonary, gi and gu systems are negative, except as otherwise noted.      Objective    BP (!) 137/91 (BP Location: Left arm, Patient Position: Sitting, Cuff Size: Adult Regular)   Pulse 85   Temp (!) 96.6  F (35.9  C) (Temporal)   Ht 1.626 m (5' 4\")   Wt 104.3 kg " (230 lb)   SpO2 96%   BMI 39.48 kg/m    Body mass index is 39.48 kg/m .  Physical Exam   GENERAL: healthy, alert and no distress  EYES: Eyes grossly normal to inspection, PERRL and conjunctivae and sclerae normal  MS: no gross musculoskeletal defects noted, no edema  MS: Both knees are normal in appearance except for a superficial abrasion sustained on the right knee last week.  This appears to be healing without infection.  Range of motion is normal with both knees.  No significant crepitus.  No effusion.  No joint line tenderness.  No patellar tenderness.  Both knees are stable to varus and valgus stress.  Anterior and posterior drawer is normal.  SKIN: no suspicious lesions or rashes  NEURO: Normal strength and tone, mentation intact and speech normal  PSYCH: mentation appears normal, affect normal/bright

## 2021-12-13 ENCOUNTER — TELEPHONE (OUTPATIENT)
Dept: FAMILY MEDICINE | Facility: CLINIC | Age: 21
End: 2021-12-13

## 2021-12-13 ENCOUNTER — OFFICE VISIT (OUTPATIENT)
Dept: FAMILY MEDICINE | Facility: CLINIC | Age: 21
End: 2021-12-13
Payer: COMMERCIAL

## 2021-12-13 VITALS
OXYGEN SATURATION: 96 % | BODY MASS INDEX: 39.27 KG/M2 | HEIGHT: 64 IN | SYSTOLIC BLOOD PRESSURE: 137 MMHG | DIASTOLIC BLOOD PRESSURE: 91 MMHG | HEART RATE: 85 BPM | TEMPERATURE: 96.6 F | WEIGHT: 230 LBS

## 2021-12-13 DIAGNOSIS — S83.004D DISLOCATION OF RIGHT PATELLA, SUBSEQUENT ENCOUNTER: Primary | ICD-10-CM

## 2021-12-13 DIAGNOSIS — Z23 HIGH PRIORITY FOR 2019-NCOV VACCINE: ICD-10-CM

## 2021-12-13 PROBLEM — Q79.62 EHLERS-DANLOS SYNDROME, TYPE 3: Status: ACTIVE | Noted: 2021-12-13

## 2021-12-13 PROBLEM — G43.909 MIGRAINE HEADACHE: Status: ACTIVE | Noted: 2021-12-13

## 2021-12-13 PROBLEM — R53.82 CHRONIC FATIGUE: Status: ACTIVE | Noted: 2021-12-13

## 2021-12-13 PROBLEM — F41.9 ANXIETY: Status: ACTIVE | Noted: 2021-12-13

## 2021-12-13 PROBLEM — F33.0 MILD RECURRENT MAJOR DEPRESSION (H): Status: ACTIVE | Noted: 2021-12-13

## 2021-12-13 PROCEDURE — 99203 OFFICE O/P NEW LOW 30 MIN: CPT | Performed by: FAMILY MEDICINE

## 2021-12-13 PROCEDURE — 0064A COVID-19,PF,MODERNA (18+ YRS BOOSTER .25ML): CPT | Performed by: FAMILY MEDICINE

## 2021-12-13 PROCEDURE — 91306 COVID-19,PF,MODERNA (18+ YRS BOOSTER .25ML): CPT | Performed by: FAMILY MEDICINE

## 2021-12-13 ASSESSMENT — MIFFLIN-ST. JEOR: SCORE: 1783.27

## 2021-12-13 NOTE — TELEPHONE ENCOUNTER
Reason for Call:  Form, our goal is to have forms completed with 72 hours, however, some forms may require a visit or additional information.    Type of letter, form or note:  Work Ability Form    Who is the form from?: Patient    Where did the form come from: Patient or family brought in       What clinic location was the form placed at?: [facility]    Where the form was placed: Given to physician    What number is listed as a contact on the form?:        Additional comments:     Call taken on 12/13/2021 at 9:29 AM by Ruslan Lennon MA

## 2021-12-13 NOTE — TELEPHONE ENCOUNTER
Form was completed with physiciian during visit.   Fax to [facility] at [phone] and [employer]

## 2022-01-30 ENCOUNTER — HEALTH MAINTENANCE LETTER (OUTPATIENT)
Age: 22
End: 2022-01-30

## 2022-05-01 ENCOUNTER — TRANSFERRED RECORDS (OUTPATIENT)
Dept: MULTI SPECIALTY CLINIC | Facility: CLINIC | Age: 22
End: 2022-05-01

## 2022-05-01 LAB — PAP SMEAR - HIM PATIENT REPORTED: NEGATIVE

## 2022-09-24 ENCOUNTER — HEALTH MAINTENANCE LETTER (OUTPATIENT)
Age: 22
End: 2022-09-24

## 2023-04-24 ASSESSMENT — ENCOUNTER SYMPTOMS
BREAST MASS: 0
JOINT SWELLING: 0
CHILLS: 1
WEAKNESS: 1
SORE THROAT: 0
MYALGIAS: 1
ABDOMINAL PAIN: 1
DIARRHEA: 1
PALPITATIONS: 0
ARTHRALGIAS: 1
FREQUENCY: 1
NAUSEA: 1
DIZZINESS: 1
EYE PAIN: 0
NERVOUS/ANXIOUS: 1
FEVER: 0
SHORTNESS OF BREATH: 0
HEADACHES: 1
DYSURIA: 0
PARESTHESIAS: 0
HEMATOCHEZIA: 1
HEARTBURN: 0

## 2023-05-01 ENCOUNTER — OFFICE VISIT (OUTPATIENT)
Dept: FAMILY MEDICINE | Facility: CLINIC | Age: 23
End: 2023-05-01
Payer: COMMERCIAL

## 2023-05-01 VITALS
OXYGEN SATURATION: 97 % | RESPIRATION RATE: 16 BRPM | TEMPERATURE: 97.8 F | BODY MASS INDEX: 40.02 KG/M2 | HEART RATE: 99 BPM | HEIGHT: 66 IN | WEIGHT: 249 LBS | DIASTOLIC BLOOD PRESSURE: 89 MMHG | SYSTOLIC BLOOD PRESSURE: 128 MMHG

## 2023-05-01 DIAGNOSIS — Z11.59 NEED FOR HEPATITIS C SCREENING TEST: ICD-10-CM

## 2023-05-01 DIAGNOSIS — N93.9 ABNORMAL UTERINE BLEEDING (AUB): ICD-10-CM

## 2023-05-01 DIAGNOSIS — R19.7 DIARRHEA, UNSPECIFIED TYPE: Primary | ICD-10-CM

## 2023-05-01 DIAGNOSIS — Z11.4 SCREENING FOR HIV (HUMAN IMMUNODEFICIENCY VIRUS): ICD-10-CM

## 2023-05-01 DIAGNOSIS — G43.009 MIGRAINE WITHOUT AURA AND WITHOUT STATUS MIGRAINOSUS, NOT INTRACTABLE: ICD-10-CM

## 2023-05-01 DIAGNOSIS — Z12.4 CERVICAL CANCER SCREENING: ICD-10-CM

## 2023-05-01 DIAGNOSIS — Z23 NEED FOR DIPHTHERIA-TETANUS-PERTUSSIS (TDAP) VACCINE: ICD-10-CM

## 2023-05-01 DIAGNOSIS — E66.01 MORBID OBESITY (H): ICD-10-CM

## 2023-05-01 LAB
ALBUMIN SERPL BCG-MCNC: 4.5 G/DL (ref 3.5–5.2)
ALP SERPL-CCNC: 74 U/L (ref 35–104)
ALT SERPL W P-5'-P-CCNC: 25 U/L (ref 10–35)
ANION GAP SERPL CALCULATED.3IONS-SCNC: 13 MMOL/L (ref 7–15)
AST SERPL W P-5'-P-CCNC: 24 U/L (ref 10–35)
BILIRUB SERPL-MCNC: 0.5 MG/DL
BUN SERPL-MCNC: 9.2 MG/DL (ref 6–20)
CALCIUM SERPL-MCNC: 10.1 MG/DL (ref 8.6–10)
CHLORIDE SERPL-SCNC: 102 MMOL/L (ref 98–107)
CHOLEST SERPL-MCNC: 175 MG/DL
CREAT SERPL-MCNC: 0.8 MG/DL (ref 0.51–0.95)
DEPRECATED HCO3 PLAS-SCNC: 23 MMOL/L (ref 22–29)
ERYTHROCYTE [DISTWIDTH] IN BLOOD BY AUTOMATED COUNT: 12.9 % (ref 10–15)
ERYTHROCYTE [SEDIMENTATION RATE] IN BLOOD BY WESTERGREN METHOD: 17 MM/HR (ref 0–20)
GFR SERPL CREATININE-BSD FRML MDRD: >90 ML/MIN/1.73M2
GLUCOSE SERPL-MCNC: 94 MG/DL (ref 70–99)
HBA1C MFR BLD: 5.2 % (ref 0–5.6)
HCT VFR BLD AUTO: 44.6 % (ref 35–47)
HDLC SERPL-MCNC: 33 MG/DL
HGB BLD-MCNC: 15.7 G/DL (ref 11.7–15.7)
LDLC SERPL CALC-MCNC: 117 MG/DL
MCH RBC QN AUTO: 29.4 PG (ref 26.5–33)
MCHC RBC AUTO-ENTMCNC: 35.2 G/DL (ref 31.5–36.5)
MCV RBC AUTO: 84 FL (ref 78–100)
NONHDLC SERPL-MCNC: 142 MG/DL
PLATELET # BLD AUTO: 379 10E3/UL (ref 150–450)
POTASSIUM SERPL-SCNC: 4.2 MMOL/L (ref 3.4–5.3)
PROLACTIN SERPL 3RD IS-MCNC: 12 NG/ML (ref 5–23)
PROT SERPL-MCNC: 7.8 G/DL (ref 6.4–8.3)
RBC # BLD AUTO: 5.34 10E6/UL (ref 3.8–5.2)
SODIUM SERPL-SCNC: 138 MMOL/L (ref 136–145)
TRIGL SERPL-MCNC: 124 MG/DL
TSH SERPL DL<=0.005 MIU/L-ACNC: 0.83 UIU/ML (ref 0.3–4.2)
WBC # BLD AUTO: 8.3 10E3/UL (ref 4–11)

## 2023-05-01 PROCEDURE — 86803 HEPATITIS C AB TEST: CPT | Performed by: FAMILY MEDICINE

## 2023-05-01 PROCEDURE — 99395 PREV VISIT EST AGE 18-39: CPT | Mod: 25 | Performed by: FAMILY MEDICINE

## 2023-05-01 PROCEDURE — 87209 SMEAR COMPLEX STAIN: CPT | Performed by: FAMILY MEDICINE

## 2023-05-01 PROCEDURE — 87506 IADNA-DNA/RNA PROBE TQ 6-11: CPT | Performed by: FAMILY MEDICINE

## 2023-05-01 PROCEDURE — 84443 ASSAY THYROID STIM HORMONE: CPT | Performed by: FAMILY MEDICINE

## 2023-05-01 PROCEDURE — 99214 OFFICE O/P EST MOD 30 MIN: CPT | Mod: 25 | Performed by: FAMILY MEDICINE

## 2023-05-01 PROCEDURE — 36415 COLL VENOUS BLD VENIPUNCTURE: CPT | Performed by: FAMILY MEDICINE

## 2023-05-01 PROCEDURE — 87177 OVA AND PARASITES SMEARS: CPT | Performed by: FAMILY MEDICINE

## 2023-05-01 PROCEDURE — 80053 COMPREHEN METABOLIC PANEL: CPT | Performed by: FAMILY MEDICINE

## 2023-05-01 PROCEDURE — 83036 HEMOGLOBIN GLYCOSYLATED A1C: CPT | Performed by: FAMILY MEDICINE

## 2023-05-01 PROCEDURE — 90715 TDAP VACCINE 7 YRS/> IM: CPT | Performed by: FAMILY MEDICINE

## 2023-05-01 PROCEDURE — 87389 HIV-1 AG W/HIV-1&-2 AB AG IA: CPT | Performed by: FAMILY MEDICINE

## 2023-05-01 PROCEDURE — 85652 RBC SED RATE AUTOMATED: CPT | Performed by: FAMILY MEDICINE

## 2023-05-01 PROCEDURE — 84146 ASSAY OF PROLACTIN: CPT | Performed by: FAMILY MEDICINE

## 2023-05-01 PROCEDURE — 90471 IMMUNIZATION ADMIN: CPT | Performed by: FAMILY MEDICINE

## 2023-05-01 PROCEDURE — 85027 COMPLETE CBC AUTOMATED: CPT | Performed by: FAMILY MEDICINE

## 2023-05-01 PROCEDURE — 80061 LIPID PANEL: CPT | Performed by: FAMILY MEDICINE

## 2023-05-01 RX ORDER — SUMATRIPTAN 100 MG/1
TABLET, FILM COATED ORAL
Status: CANCELLED | OUTPATIENT
Start: 2023-05-01

## 2023-05-01 RX ORDER — SUMATRIPTAN 50 MG/1
50 TABLET, FILM COATED ORAL
Qty: 16 TABLET | Refills: 3 | Status: SHIPPED | OUTPATIENT
Start: 2023-05-01

## 2023-05-01 RX ORDER — SUMATRIPTAN 50 MG/1
50 TABLET, FILM COATED ORAL
COMMUNITY
Start: 2022-10-15

## 2023-05-01 ASSESSMENT — ENCOUNTER SYMPTOMS
SHORTNESS OF BREATH: 0
HEADACHES: 1
FEVER: 0
ARTHRALGIAS: 1
HEARTBURN: 0
ABDOMINAL PAIN: 1
CHILLS: 1
HEMATOCHEZIA: 1
JOINT SWELLING: 0
DIARRHEA: 1
PALPITATIONS: 0
WEAKNESS: 1
BREAST MASS: 0
SORE THROAT: 0
PARESTHESIAS: 0
MYALGIAS: 1
EYE PAIN: 0
NAUSEA: 1
NERVOUS/ANXIOUS: 1
DYSURIA: 0
FREQUENCY: 1
DIZZINESS: 1

## 2023-05-01 ASSESSMENT — PATIENT HEALTH QUESTIONNAIRE - PHQ9
SUM OF ALL RESPONSES TO PHQ QUESTIONS 1-9: 9
10. IF YOU CHECKED OFF ANY PROBLEMS, HOW DIFFICULT HAVE THESE PROBLEMS MADE IT FOR YOU TO DO YOUR WORK, TAKE CARE OF THINGS AT HOME, OR GET ALONG WITH OTHER PEOPLE: SOMEWHAT DIFFICULT
SUM OF ALL RESPONSES TO PHQ QUESTIONS 1-9: 9

## 2023-05-01 ASSESSMENT — PAIN SCALES - GENERAL: PAINLEVEL: SEVERE PAIN (6)

## 2023-05-01 NOTE — RESULT ENCOUNTER NOTE
"Dear Aubrey,   Here are your recent results. All results are within normal limits   Except for a mild increase in your LDL \"bad cholesterol\".  Continue to monitor your dietary intake of complex carbohydrates and saturated fatty acids.  We will recheck your cholesterol in 1 year.    Best Regards   Maricarmen Barnard MD"

## 2023-05-01 NOTE — Clinical Note
Pap smear completed at  at [facility]. Normal pap one year ago. Due for a pap in 2 years. ASHLI signed.

## 2023-05-01 NOTE — PROGRESS NOTES
SUBJECTIVE:   CC: Aubrey is an 23 year old who presents for preventive health visit.       5/1/2023     9:41 AM   Additional Questions   Roomed by josué zaragoza     Patient has been advised of split billing requirements and indicates understanding: Yes  Healthy Habits:     Getting at least 3 servings of Calcium per day:  Yes    Bi-annual eye exam:  NO    Dental care twice a year:  NO    Sleep apnea or symptoms of sleep apnea:  Daytime drowsiness    Diet:  Gluten-free/reduced and Breakfast skipped    Frequency of exercise:  1 day/week    Duration of exercise:  Less than 15 minutes    Taking medications regularly:  No    Barriers to taking medications:  Problems remembering to take them    Medication side effects:  Not applicable    PHQ-2 Total Score: 2    Additional concerns today:  Yes    Abdominal bulge - appeared in March on L side, noticed asymmetric swelling. Also occurring with pain pelvic/abdominal sometimes sharp sometimes throbbing, that has progressively gotten worse since then, now accompanied with nausea/emesis and unable to keep food down. No changes noticed to diet, or recent travel. Has taken tylenol and antinausea meds, not sure if they have helped    ER visit on March 16 - when pain got severe, pos for UTI and received abx. No urinary sx then or now aside from urgency. Abdominal/pelvic CT scan done - no hernia, diverticulitis.    ABD symptoms - started as pressure, urinary urgency, bloating, now has pain ranging from a 4-6/10, sometimes up to an 8/10. Denies fevers.  Stool changes - was constipated in March when this started (no stool for 1 wk) - once able to go, noticed blood. Has had loose and watery stool since that is green and bloody.  Nausea, vomiting & diarrhea - worse this past week, for 3 days can't keep anything down and has emesis or dirrhea after eating anything    Hx of gallbladder and appendix removal - has had nausea/diarrhea historically but symptoms mostly resolved after gallbladder  "removed.    Gyn related - Hx of PCOS, was told when younger that her L ovary was \"immobile\". Has had ruptured cysts before, feels that one ruptured on R side this week (with a bleeding episode after, unusual as she doesn't get periods with IUD) - notes that this L side pain does not feel like that.  Pap smear completed at  at [facility]. Normal pap one year ago. Due for a pap in 2 years. ASHLI signed.     Also notes some heart concerns, feels her heart races sometimes and is worse when leaning forward or with exercise. Denies severe chest pain, shortness of breath, does report chest pressure. Pt has ehler-danlos syndrome, and is interested in testing for postural orthostatic tachycardia, as her mom has both - but is not her priority today    Today's PHQ-2 Score:       4/24/2023     6:37 PM   PHQ-2 ( 1999 [employer])   Q1: Little interest or pleasure in doing things 1   Q2: Feeling down, depressed or hopeless 1   PHQ-2 Score 2   Q1: Little interest or pleasure in doing things Several days   Q2: Feeling down, depressed or hopeless Several days   PHQ-2 Score 2       Have you ever done Advance Care Planning? (For example, a Health Directive, POLST, or a discussion with a medical provider or your loved ones about your wishes): No, advance care planning information given to patient to review.  Patient declined advance care planning discussion at this time.    Social History     Tobacco Use     Smoking status: Never     Smokeless tobacco: Never   Vaping Use     Vaping status: Not on file   Substance Use Topics     Alcohol use: Not Currently         4/24/2023     6:37 PM   Alcohol Use   Prescreen: >3 drinks/day or >7 drinks/week? No          View : No data to display.              Reviewed orders with patient.  Reviewed health maintenance and updated orders accordingly - Yes  Labs reviewed in EPIC    Breast Cancer Screening:    FHS-7:       4/24/2023     6:38 PM   Breast CA Risk Assessment (FHS-7)   Did any of your first-degree " relatives have breast or ovarian cancer? No   Did any of your relatives have bilateral breast cancer? Unknown   Did any man in your family have breast cancer? No   Did any woman in your family have breast and ovarian cancer? Yes   Did any woman in your family have breast cancer before age 50 y? Yes   Do you have 2 or more relatives with breast and/or ovarian cancer? Unknown   Do you have 2 or more relatives with breast and/or bowel cancer? Unknown     Patient under 40 years of age: Routine Mammogram Screening not recommended.   Pertinent mammograms are reviewed under the imaging tab.    History of abnormal Pap smear: had to redo pap at , thinks it may have been abnormal but will verify when records available.     Reviewed and updated as needed this visit by clinical staff                Reviewed and updated as needed this visit by Provider                 Review of Systems   Constitutional: Positive for chills. Negative for fever.   HENT: Positive for congestion. Negative for ear pain, hearing loss and sore throat.    Eyes: Negative for pain and visual disturbance.   Respiratory: Negative for shortness of breath.    Cardiovascular: Negative for chest pain, palpitations and peripheral edema.   Gastrointestinal: Positive for abdominal pain, diarrhea, hematochezia and nausea. Negative for heartburn.   Breasts:  Positive for tenderness. Negative for breast mass and discharge.   Genitourinary: Positive for frequency, pelvic pain and vaginal discharge. Negative for dysuria, genital sores, urgency and vaginal bleeding.   Musculoskeletal: Positive for arthralgias and myalgias. Negative for joint swelling.   Skin: Negative for rash.   Neurological: Positive for dizziness, weakness and headaches. Negative for paresthesias.   Psychiatric/Behavioral: Negative for mood changes. The patient is nervous/anxious.         OBJECTIVE:   There were no vitals taken for this visit.  Physical Exam  GENERAL: healthy, alert and no  distress  EYES: Eyes grossly normal to inspection, PERRL and conjunctivae and sclerae normal  HENT: ear canals and TM's normal, nose and mouth without ulcers or lesions  NECK: no adenopathy, no asymmetry, masses, or scars and thyroid normal to palpation  RESP: lungs clear to auscultation - no rales, rhonchi or wheezes  BREAST: normal without masses, tenderness or nipple discharge and no palpable axillary masses or adenopathy  CV: regular rate and rhythm, normal S1 S2, no S3 or S4, no murmur, click or rub, no peripheral edema and peripheral pulses strong  ABDOMEN: soft, no hepatosplenomegaly, no masses and bowel sounds normal - pain with palpation on LLQ, no visible swelling on left side, No masses palpable at site of pain.  MS: no gross musculoskeletal defects noted, no edema  SKIN: no suspicious lesions or rashes  NEURO: Normal strength and tone, mentation intact and speech normal  PSYCH: mentation appears normal, affect normal/bright    Diagnostic Test Results:  Labs reviewed in Epic    ASSESSMENT/PLAN:   Health Maintenance / 1. Screening for HIV & 2. Need for hepatitis C screening test & 6. Need for Tdap vaccine  Due for Tdap vaccine unsure of last dose, agreeable to HIV/hep C screening. Patient fasting, will screen for diabetes and cholesterol today as well. [facility] Had [facility], will wait for records to determine timing of next screening; due in 1 or 3 years.  Patient is interested in meeting with nutritionist to discuss weight loss, PCOS, and food allergies.   - HIV Antigen Antibody Combo  - Hepatitis C Screen Reflex to HCV RNA Quant and Genotype  - TDAP VACCINE (Adacel, Boostrix)  - Nutrition referral     4. Diarrhea, unspecified type  Will test stool for infectious causes of symptoms, can continue over the counter pain and nausea relief as desired.  Ct normal in March rules out diverticulitis, hernia. Plan for ultrasound at site of pain and refer to gasteroenterlogy if all  other testing comes back normal and bloody stool continues  - Lipid panel reflex to direct LDL Fasting  - Comprehensive metabolic panel  - CBC with platelets  - Hemoglobin A1c  - Enteric Bacteria and Virus Panel by [facility] Stool  - Ova and Parasite Exam Routine  - ESR: Erythrocyte sedimentation rate    5. Abnormal uterine bleeding (AUB)  No cycles on IUD, did not get regular cycles prior to placement. Hx of PCOS  - Prolactin  - TSH with free T4 reflex    7. Migraine without aura and without status migrainosus, not intractable  Gets migraines 1x/week, denies aura, Imitrex helps - usually helps her sleep and migraine is gone after  - SUMAtriptan (IMITREX) 50 MG tablet; Take 1 tablet (50 mg) by mouth at onset of headache for migraine May repeat in 2 hours. Max 4 tablets/24 hours.  Dispense: 16 tablet; Refill: 3      Patient has been advised of split billing requirements and indicates understanding: Yes    COUNSELING:  Reviewed preventive health counseling, as reflected in patient instructions       Healthy diet/nutrition       HIV screeninx in teen years, 1x in adult years, and at intervals if high risk  Reviewed HPV vaccination recommendation, pt will think about it.  Discussed PCOS and metabolic risks associated, can order pelvic ultrasound if no positive results from stool testing. Seeing gyn today for further discussion of PCOS management    She reports that she has never smoked. She has never used smokeless tobacco.    RHIANNON Newsome, Student [facility]     Provider Disclosure:  I was present with the student who participated in the service and in the documentation of the note. I have verified the history and personally performed the physical exam and medical decision-making. I agree with the assessment and plan of care as documented in the note.    Maricarmen Barnard MD  [facility]

## 2023-05-02 LAB
C COLI+JEJUNI+LARI FUSA STL QL NAA+PROBE: NOT DETECTED
EC STX1 GENE STL QL NAA+PROBE: NOT DETECTED
EC STX2 GENE STL QL NAA+PROBE: NOT DETECTED
HCV AB SERPL QL IA: NONREACTIVE
HIV 1+2 AB+HIV1 P24 AG SERPL QL IA: NONREACTIVE
NOROV GI+II ORF1-ORF2 JNC STL QL NAA+PR: NOT DETECTED
RVA NSP5 STL QL NAA+PROBE: NOT DETECTED
SALMONELLA SP RPOD STL QL NAA+PROBE: NOT DETECTED
SHIGELLA SP+EIEC IPAH STL QL NAA+PROBE: NOT DETECTED
V CHOL+PARA RFBL+TRKH+TNAA STL QL NAA+PR: NOT DETECTED
Y ENTERO RECN STL QL NAA+PROBE: NOT DETECTED

## 2023-05-03 LAB — O+P STL MICRO: NEGATIVE

## 2023-05-04 ENCOUNTER — MYC MEDICAL ADVICE (OUTPATIENT)
Dept: FAMILY MEDICINE | Facility: CLINIC | Age: 23
End: 2023-05-04
Payer: COMMERCIAL

## 2023-05-04 DIAGNOSIS — R19.7 DIARRHEA, UNSPECIFIED TYPE: Primary | ICD-10-CM

## 2024-07-14 ENCOUNTER — HEALTH MAINTENANCE LETTER (OUTPATIENT)
Age: 24
End: 2024-07-14

## 2024-11-03 ASSESSMENT — ANXIETY QUESTIONNAIRES: GAD7 TOTAL SCORE: 14

## 2025-07-19 ENCOUNTER — HEALTH MAINTENANCE LETTER (OUTPATIENT)
Age: 25
End: 2025-07-19